# Patient Record
Sex: FEMALE | Race: WHITE | NOT HISPANIC OR LATINO | Employment: FULL TIME | URBAN - METROPOLITAN AREA
[De-identification: names, ages, dates, MRNs, and addresses within clinical notes are randomized per-mention and may not be internally consistent; named-entity substitution may affect disease eponyms.]

---

## 2024-10-18 ENCOUNTER — HOSPITAL ENCOUNTER (OUTPATIENT)
Dept: RADIOLOGY | Facility: HOSPITAL | Age: 49
Discharge: HOME | End: 2024-10-18
Attending: ORTHOPAEDIC SURGERY
Payer: COMMERCIAL

## 2024-10-18 ENCOUNTER — OFFICE VISIT (OUTPATIENT)
Dept: OBGYN CLINIC | Facility: HOSPITAL | Age: 49
End: 2024-10-18
Payer: COMMERCIAL

## 2024-10-18 VITALS — DIASTOLIC BLOOD PRESSURE: 85 MMHG | SYSTOLIC BLOOD PRESSURE: 127 MMHG | HEART RATE: 64 BPM

## 2024-10-18 DIAGNOSIS — M25.571 PAIN, JOINT, ANKLE AND FOOT, RIGHT: ICD-10-CM

## 2024-10-18 DIAGNOSIS — S86.111A POSTERIOR TIBIAL TENDON TEAR, TRAUMATIC, RIGHT, INITIAL ENCOUNTER: ICD-10-CM

## 2024-10-18 DIAGNOSIS — S93.401A SPRAIN OF UNSPECIFIED LIGAMENT OF RIGHT ANKLE, INITIAL ENCOUNTER: Primary | ICD-10-CM

## 2024-10-18 PROCEDURE — 73610 X-RAY EXAM OF ANKLE: CPT

## 2024-10-18 PROCEDURE — 99213 OFFICE O/P EST LOW 20 MIN: CPT | Performed by: ORTHOPAEDIC SURGERY

## 2024-10-18 NOTE — LETTER
October 18, 2024     Patient: Simran Vivar  YOB: 1975  Date of Visit: 10/18/2024      To Whom it May Concern:    Simran Vivar is under my professional care. Simran was seen in my office on 10/18/2024. Simran may return to work on 11/2/2024 .    If you have any questions or concerns, please don't hesitate to call.         Sincerely,      Dale Thornton MD, FAOA          CC: No Recipients

## 2024-10-18 NOTE — ASSESSMENT & PLAN NOTE
Her tendon is intact currently and she has insertional tendinitis where she injured this tendon.  We are going to put her in a cast boot.  She is going to get some arch supports at our [facility] and wear those with her inside the boot.  She lives in [place] but we be happy to see her back here.  If she does not get better in 2 to 3 weeks she will let us know.
